# Patient Record
Sex: FEMALE | Race: WHITE | Employment: FULL TIME | URBAN - METROPOLITAN AREA
[De-identification: names, ages, dates, MRNs, and addresses within clinical notes are randomized per-mention and may not be internally consistent; named-entity substitution may affect disease eponyms.]

---

## 2021-08-02 ENCOUNTER — APPOINTMENT (OUTPATIENT)
Dept: GENERAL RADIOLOGY | Age: 21
End: 2021-08-02
Payer: COMMERCIAL

## 2021-08-02 ENCOUNTER — HOSPITAL ENCOUNTER (EMERGENCY)
Age: 21
Discharge: HOME | End: 2021-08-02
Attending: EMERGENCY MEDICINE
Payer: COMMERCIAL

## 2021-08-02 VITALS
TEMPERATURE: 97.9 F | SYSTOLIC BLOOD PRESSURE: 122 MMHG | DIASTOLIC BLOOD PRESSURE: 71 MMHG | WEIGHT: 113.1 LBS | HEART RATE: 85 BPM | RESPIRATION RATE: 17 BRPM | HEIGHT: 62 IN | OXYGEN SATURATION: 100 % | BODY MASS INDEX: 20.81 KG/M2

## 2021-08-02 DIAGNOSIS — J06.9 UPPER RESPIRATORY TRACT INFECTION, UNSPECIFIED TYPE: Primary | ICD-10-CM

## 2021-08-02 PROCEDURE — 96372 THER/PROPH/DIAG INJ SC/IM: CPT

## 2021-08-02 PROCEDURE — U0003 INFECTIOUS AGENT DETECTION BY NUCLEIC ACID (DNA OR RNA); SEVERE ACUTE RESPIRATORY SYNDROME CORONAVIRUS 2 (SARS-COV-2) (CORONAVIRUS DISEASE [COVID-19]), AMPLIFIED PROBE TECHNIQUE, MAKING USE OF HIGH THROUGHPUT TECHNOLOGIES AS DESCRIBED BY CMS-2020-01-R: HCPCS

## 2021-08-02 PROCEDURE — U0005 INFEC AGEN DETEC AMPLI PROBE: HCPCS

## 2021-08-02 PROCEDURE — 70360 X-RAY EXAM OF NECK: CPT

## 2021-08-02 PROCEDURE — 6370000000 HC RX 637 (ALT 250 FOR IP): Performed by: EMERGENCY MEDICINE

## 2021-08-02 PROCEDURE — 6360000002 HC RX W HCPCS: Performed by: EMERGENCY MEDICINE

## 2021-08-02 PROCEDURE — 99285 EMERGENCY DEPT VISIT HI MDM: CPT

## 2021-08-02 PROCEDURE — 71046 X-RAY EXAM CHEST 2 VIEWS: CPT

## 2021-08-02 RX ORDER — NORETHINDRONE ACETATE AND ETHINYL ESTRADIOL AND FERROUS FUMARATE 1MG-20(24)
1 KIT ORAL
COMMUNITY
Start: 2021-01-20 | End: 2022-01-15

## 2021-08-02 RX ORDER — SODIUM CHLORIDE FOR INHALATION 0.9 %
3 VIAL, NEBULIZER (ML) INHALATION EVERY 4 HOURS PRN
Status: DISCONTINUED | OUTPATIENT
Start: 2021-08-02 | End: 2021-08-02 | Stop reason: HOSPADM

## 2021-08-02 RX ORDER — DEXAMETHASONE SODIUM PHOSPHATE 4 MG/ML
10 INJECTION, SOLUTION INTRA-ARTICULAR; INTRALESIONAL; INTRAMUSCULAR; INTRAVENOUS; SOFT TISSUE ONCE
Status: COMPLETED | OUTPATIENT
Start: 2021-08-02 | End: 2021-08-02

## 2021-08-02 RX ORDER — PREDNISONE 20 MG/1
60 TABLET ORAL DAILY
Qty: 15 TABLET | Refills: 0 | Status: SHIPPED | OUTPATIENT
Start: 2021-08-02 | End: 2021-08-07

## 2021-08-02 RX ADMIN — Medication 3 ML: at 10:28

## 2021-08-02 RX ADMIN — DEXAMETHASONE SODIUM PHOSPHATE 10 MG: 4 INJECTION, SOLUTION INTRAMUSCULAR; INTRAVENOUS at 10:23

## 2021-08-02 RX ADMIN — RACEPINEPHRINE HYDROCHLORIDE 11.25 MG: 11.25 SOLUTION RESPIRATORY (INHALATION) at 10:28

## 2021-08-02 ASSESSMENT — PAIN SCALES - GENERAL
PAINLEVEL_OUTOF10: 0

## 2021-08-02 NOTE — ED PROVIDER NOTES
CHIEF COMPLAINT  Chief Complaint   Patient presents with    Shortness of Breath     Awoke with difficulty getting her air in. Took Benadryl around 0900. No history of asthma. Lost her voice yesterday. HISTORY OF PRESENT ILLNESS  Alton Sharif is a 23 y.o. female who presents to the ED complaining of 1 to 2-day history of tightness in her throat, shortness of breath, loss of voice and mild sore throat. Nonproductive cough. No fevers or chills. No loss of smell or taste. Patient is vaccinated for Covid. No myalgias. No headaches. No nausea or vomiting. No diarrhea. No chest pain. No ear pain. No other complaints, modifying factors or associated symptoms. Nursing notes reviewed. Past Medical History:   Diagnosis Date    Spondylisthesis      Past Surgical History:   Procedure Laterality Date    TONSILLECTOMY      TONSILLECTOMY AND ADENOIDECTOMY       History reviewed. No pertinent family history. Social History     Socioeconomic History    Marital status: Single     Spouse name: Not on file    Number of children: Not on file    Years of education: Not on file    Highest education level: Not on file   Occupational History    Not on file   Tobacco Use    Smoking status: Never Smoker    Smokeless tobacco: Current User     Types: Snuff   Substance and Sexual Activity    Alcohol use: Yes     Comment: occasionally    Drug use: No    Sexual activity: Yes     Partners: Male   Other Topics Concern    Not on file   Social History Narrative    Not on file     Social Determinants of Health     Financial Resource Strain:     Difficulty of Paying Living Expenses:    Food Insecurity:     Worried About Running Out of Food in the Last Year:     [address] in the Last Year:    Transportation Needs:     Lack of Transportation (Medical):      Lack of Transportation (Non-Medical):    Physical Activity:     Days of Exercise per Week:     Minutes of Exercise per Session:    Stress:  Feeling of Stress :    Social Connections:     Frequency of Communication with Friends and Family:     Frequency of Social Gatherings with Friends and Family:     Attends Confucianism Services:     Active Member of Clubs or Organizations:     Attends Club or Organization Meetings:     Marital Status:    Intimate Partner Violence:     Fear of Current or Ex-Partner:     Emotionally Abused:     Physically Abused:     Sexually Abused:      Current Facility-Administered Medications   Medication Dose Route Frequency Provider Last Rate Last Admin    racepinephrine HCl (VAPONEFPRIN) 2.25 % nebulizer solution NEBU 11.25 mg  11.25 mg Nebulization Q4H PRN Irby Homans, MD   11.25 mg at 08/02/21 1028    sodium chloride nebulizer 0.9 % solution 3 mL  3 mL Nebulization Q4H PRN Irby Homans, MD   3 mL at 08/02/21 1028     Current Outpatient Medications   Medication Sig Dispense Refill    Norethin Ace-Eth Estrad-FE 1-20 MG-MCG(24) CHEW 1 tablet      predniSONE (DELTASONE) 20 MG tablet Take 3 tablets by mouth daily for 5 days 15 tablet 0     Allergies   Allergen Reactions    Morphine And Related Anaphylaxis and Other (See Comments)     Hard time waking up  Pt became unresponsive      Coconut Flavor        REVIEW OF SYSTEMS  Positives and pertinent negatives as per HPI. Six other systems were reviewed and are negative. Nursing notes pertaining to ROS were reviewed. PHYSICAL EXAM   /71   Pulse 85   Temp 97.9 °F (36.6 °C) (Oral)   Resp 17   Ht 5' 2\" (1.575 m)   Wt 113 lb 1.5 oz (51.3 kg)   LMP 07/27/2021 (Approximate)   SpO2 100%   BMI 20.69 kg/m²   GENERAL APPEARANCE: Awake and alert. Cooperative. No acute distress. No increased work of breathing or accessory muscle use. HEAD: Normocephalic. Atraumatic. EYES: PERRL. EOM's grossly intact. No scleral icterus, injection or exudate. ENT: Mucous membranes are moist.  TMs are clear bilaterally.   Oral cavity is clear without tonsillar hypertrophy or exudate. No palatal petechiae. No frontal or maxillary sinus tenderness to palpation. Nasal MM are clear. NECK: Supple. Normal ROM. No cervical lymphadenopathy. CHEST:  Regular rate and rhythm, no murmurs, rubs or gallops. LUNGS: Breathing is unlabored. Speaking comfortably in full sentences. Clear through auscultation bilaterally  ABDOMEN: Nondistended, nontender. EXTREMITIES: MAEE. No acute deformities. SKIN: Warm and dry. No rash  NEUROLOGICAL: Alert and oriented. RADIOLOGY    XR CHEST (2 VW)   Final Result   Mild prominence of the lingual tonsils. This may be in part positional in   nature. Radiographic imaging of the soft tissue of the neck is otherwise   unremarkable. Lungs are mildly hyperexpanded and are grossly clear. XR NECK SOFT TISSUE   Final Result   Mild prominence of the lingual tonsils. This may be in part positional in   nature. Radiographic imaging of the soft tissue of the neck is otherwise   unremarkable. Lungs are mildly hyperexpanded and are grossly clear. ED COURSE/MDM  Upper respiratory infection: Patient has hoarseness and symptoms of laryngitis but is not dyspneic, hypoxic, febrile and is otherwise well-appearing. Patient was given 1 dose of racemic epinephrine with complete resolution of her initial stridor. Patient has no evidence of epiglottitis and is otherwise nontoxic appearing. Patient was given 1 injection of dexamethasone and will be given a 5-day course of prednisone. Patient was tested for Covid but I believe this diagnosis is much less likely with the presentation. Patient was given Covid discharge and isolation instructions. Patient was instructed to return to emergency room for any worsening symptoms. Patient was given scripts for the following medications. I counseled patient how to take these medications.    New Prescriptions    PREDNISONE (DELTASONE) 20 MG TABLET    Take 3 tablets by mouth daily for 5 days         CLINICAL IMPRESSION  1. Upper respiratory tract infection, unspecified type        Blood pressure 122/71, pulse 85, temperature 97.9 °F (36.6 °C), temperature source Oral, resp. rate 17, height 5' 2\" (1.575 m), weight 113 lb 1.5 oz (51.3 kg), last menstrual period 07/27/2021, SpO2 100 %, not currently breastfeeding.       Follow-up with:  [address] [place], [place] [id number] [phone]    In 3 days  If symptoms worsen          Tequila Goldberg MD  08/02/21 2301

## 2021-08-02 NOTE — LETTER
[facility] [address]  Phone: [phone]               August 2, 2021    Patient: Rosita No   YOB: 2000   Date of Visit: 8/2/2021       To Whom It May Concern:    Amparo Huynh was seen and treated in our emergency department on 8/2/2021. She may return to work on 8/4/21, provided her covid test is negative. If it is positive she should be off for 10 days.       Sincerely,       Dr. Lee Lassiter,         Signature:__________________________________

## 2021-08-02 NOTE — ED NOTES
States her voice is stronger. Feeling a little better. Mom remains at bedside. Tolerating oral fluids.      Alicia Lam RN  08/02/21 3345

## 2021-08-03 LAB — SARS-COV-2: NOT DETECTED

## 2022-01-01 ENCOUNTER — APPOINTMENT (OUTPATIENT)
Dept: GENERAL RADIOLOGY | Age: 22
End: 2022-01-01
Payer: COMMERCIAL

## 2022-01-01 ENCOUNTER — HOSPITAL ENCOUNTER (EMERGENCY)
Age: 22
Discharge: HOME | End: 2022-01-02
Attending: EMERGENCY MEDICINE
Payer: COMMERCIAL

## 2022-01-01 VITALS
DIASTOLIC BLOOD PRESSURE: 76 MMHG | HEART RATE: 99 BPM | SYSTOLIC BLOOD PRESSURE: 117 MMHG | TEMPERATURE: 98.8 F | OXYGEN SATURATION: 100 % | RESPIRATION RATE: 14 BRPM

## 2022-01-01 DIAGNOSIS — S50.11XA CONTUSION OF RIGHT FOREARM, INITIAL ENCOUNTER: Primary | ICD-10-CM

## 2022-01-01 PROCEDURE — 73090 X-RAY EXAM OF FOREARM: CPT

## 2022-01-01 PROCEDURE — 99282 EMERGENCY DEPT VISIT SF MDM: CPT

## 2022-01-01 ASSESSMENT — PAIN DESCRIPTION - ORIENTATION: ORIENTATION: RIGHT

## 2022-01-01 ASSESSMENT — PAIN DESCRIPTION - LOCATION: LOCATION: ARM

## 2022-01-01 ASSESSMENT — PAIN SCALES - GENERAL: PAINLEVEL_OUTOF10: 8

## 2022-01-01 ASSESSMENT — PAIN DESCRIPTION - PAIN TYPE: TYPE: ACUTE PAIN

## 2022-01-01 NOTE — LETTER
[address] [place] [address]  Phone: [phone]               January 1, 2022    Patient: Murphy Bocanegra   YOB: 2000   Date of Visit: 1/1/2022       To Whom It May Concern:    Troy Leventhal was seen and treated in our emergency department on 1/1/2022.  She may return to work on [address] [place] [address] or [address] [place] [address]      Sincerely,               Signature:__________________________________

## 2022-01-02 NOTE — ED PROVIDER NOTES
[facility]  eMERGENCY dEPARTMENT eNCOUnter      Pt Name: Anne-Marie Alonzo  MRN: [id number]  [place] 2000  Date of evaluation: 1/1/2022  Provider: Akosua Dougherty MD    [facility]       Chief Complaint   Patient presents with    Arm Injury     right slipped and fell  today         HISTORY OF PRESENT ILLNESS  (Location/Symptom, Timing/Onset, Context/Setting, Quality, Duration, Modifying Factors, Severity.)   Anne-Marie Alonzo is a 24 y.o. female who presents to the emergency department planing of an injury to her right forearm that occurred today. She was carrying some groceries. She slipped and fell on a hill. She landed on her right forearm. She has some proximal right forearm pain just below her elbow. He states she has little bit of tingling in her right fourth and fifth fingers. No wrist pain. No hand pain. No other injuries from the fall. Nursing Notes were reviewed and I agree. REVIEW OF SYSTEMS    (2-9 systems for level 4, 10 or more for level 5)     Musculoskeletal: Right forearm pain and swelling. No wrist or hand pain. Skin: Some bruising to the right forearm. Neuro: Tingling in her right fourth and fifth fingers. Except as noted above the remainder of the review of systems was reviewed and negative. PAST MEDICAL HISTORY         Diagnosis Date    Spondylisthesis        SURGICAL HISTORY           Procedure Laterality Date    TONSILLECTOMY      TONSILLECTOMY AND ADENOIDECTOMY         CURRENT MEDICATIONS       Previous Medications    NORETHIN ACE-ETH ESTRAD-FE 1-20 MG-MCG(24) CHEW    1 tablet       ALLERGIES     Coconut oil, Morphine and related, and Coconut flavor    FAMILY HISTORY     No family history on file. No family status information on file. SOCIAL HISTORY      reports that she has never smoked. Her smokeless tobacco use includes snuff. She reports current alcohol use.  She reports that she does not use drugs.    PHYSICAL EXAM    (up to 7 for level 4, 8 or more for level 5)     ED Triage Vitals [01/01/22 1920]   BP Temp Temp src Pulse Resp SpO2 Height Weight   117/76 98.8 °F (37.1 °C) -- 99 14 100 % -- --       General: Alert white female no acute distress. Musculoskeletal: Right clavicle, shoulder, and AC joint are nontender with intact range of motion without pain. Right elbow is nontender without swelling. She has intact flexion, extension, pronation and supination. The proximal posterior lateral right forearm is tender with some mild swelling. The mid and distal forearm and wrist are nontender. Full range of motion of the wrist and all the digits in the hand. Intact distal pulses. Skin: There is some minimal bruising developing over the small posterior lateral forearm. Neuro: Intact motor function sensation right upper extremity. DIAGNOSTIC RESULTS     RADIOLOGY:   Non-plain film images such as CT, Ultrasound and MRI are read by the radiologist. Plain radiographic images are visualized and preliminarily interpreted by Ludivina Leiva MD with the below findings:      Interpretation per the Radiologist below, if available at the time of this note:    XR RADIUS ULNA RIGHT (2 VIEWS)   Final Result   No acute fracture or dislocation. LABS:  Labs Reviewed - No data to display    All other labs were within normal range or not returned as of this dictation. EMERGENCY DEPARTMENT COURSE and DIFFERENTIAL DIAGNOSIS/MDM:   Vitals:    Vitals:    01/01/22 1920   BP: 117/76   Pulse: 99   Resp: 14   Temp: 98.8 °F (37.1 °C)   SpO2: 100%       This patient slipped and fell and injured her right forearm. She has no evidence of fracture on x-ray. Injuries consistent with a contusion. Some tingling in her ring and fifth finger. I think the contusion is in the area of the ulnar nerve which is resulting in the paresthesias. An Ace wrap was placed. Ice and elevation.   Tylenol or ibuprofen for pain.  Follow-up in a week if not improved. X-ray results, diagnosis, and treatment plan were discussed with the patient. She understands the treatment plan follow-up as discussed    PROCEDURES:  None    FINAL IMPRESSION      1.  Contusion of right forearm, initial encounter          DISPOSITION/PLAN   DISPOSITION Decision To Discharge 01/01/2022 08:29:33 PM      PATIENT REFERRED TO:  [address] [place] [address]  [phone]    In 1 week  If symptoms worsen      DISCHARGE MEDICATIONS:  New Prescriptions    No medications on file       (Please note that portions of this note were completed with a voice recognition program.  Efforts were made to edit the dictations but occasionally words are mis-transcribed.)    Taj Priest MD  Attending Emergency Physician        Niya Sharif MD  01/01/22 2031